# Patient Record
Sex: FEMALE | URBAN - METROPOLITAN AREA
[De-identification: names, ages, dates, MRNs, and addresses within clinical notes are randomized per-mention and may not be internally consistent; named-entity substitution may affect disease eponyms.]

---

## 2022-07-25 ENCOUNTER — APPOINTMENT (RX ONLY)
Dept: URBAN - METROPOLITAN AREA CLINIC 292 | Facility: CLINIC | Age: 42
Setting detail: DERMATOLOGY
End: 2022-07-25

## 2022-07-25 DIAGNOSIS — L21.8 OTHER SEBORRHEIC DERMATITIS: ICD-10-CM

## 2022-07-25 DIAGNOSIS — B07.8 OTHER VIRAL WARTS: ICD-10-CM

## 2022-07-25 DIAGNOSIS — L57.8 OTHER SKIN CHANGES DUE TO CHRONIC EXPOSURE TO NONIONIZING RADIATION: ICD-10-CM

## 2022-07-25 DIAGNOSIS — D22 MELANOCYTIC NEVI: ICD-10-CM

## 2022-07-25 DIAGNOSIS — H01.13 ECZEMATOUS DERMATITIS OF EYELID: ICD-10-CM | Status: INADEQUATELY CONTROLLED

## 2022-07-25 DIAGNOSIS — D485 NEOPLASM OF UNCERTAIN BEHAVIOR OF SKIN: ICD-10-CM

## 2022-07-25 PROBLEM — H01.131 ECZEMATOUS DERMATITIS OF RIGHT UPPER EYELID: Status: ACTIVE | Noted: 2022-07-25

## 2022-07-25 PROBLEM — H01.134 ECZEMATOUS DERMATITIS OF LEFT UPPER EYELID: Status: ACTIVE | Noted: 2022-07-25

## 2022-07-25 PROBLEM — D48.5 NEOPLASM OF UNCERTAIN BEHAVIOR OF SKIN: Status: ACTIVE | Noted: 2022-07-25

## 2022-07-25 PROBLEM — D22.9 MELANOCYTIC NEVI, UNSPECIFIED: Status: ACTIVE | Noted: 2022-07-25

## 2022-07-25 PROCEDURE — ? BIOPSY BY SHAVE METHOD

## 2022-07-25 PROCEDURE — ? FULL BODY SKIN EXAM - DECLINED

## 2022-07-25 PROCEDURE — 17110 DESTRUCTION B9 LES UP TO 14: CPT

## 2022-07-25 PROCEDURE — ? LIQUID NITROGEN

## 2022-07-25 PROCEDURE — ? COUNSELING

## 2022-07-25 PROCEDURE — 11102 TANGNTL BX SKIN SINGLE LES: CPT | Mod: 59

## 2022-07-25 PROCEDURE — 99203 OFFICE O/P NEW LOW 30 MIN: CPT | Mod: 25

## 2022-07-25 PROCEDURE — ? TREATMENT REGIMEN

## 2022-07-25 ASSESSMENT — LOCATION ZONE DERM
LOCATION ZONE: NOSE
LOCATION ZONE: TRUNK
LOCATION ZONE: FACE
LOCATION ZONE: EYELID
LOCATION ZONE: ARM
LOCATION ZONE: FINGER

## 2022-07-25 ASSESSMENT — LOCATION DETAILED DESCRIPTION DERM
LOCATION DETAILED: RIGHT ELBOW
LOCATION DETAILED: LEFT DISTAL PALMAR INDEX FINGER
LOCATION DETAILED: LEFT INFERIOR UPPER BACK
LOCATION DETAILED: RIGHT MEDIAL SUPERIOR EYELID
LOCATION DETAILED: LEFT NASAL ALA
LOCATION DETAILED: RIGHT MEDIAL MALAR CHEEK
LOCATION DETAILED: LEFT MEDIAL SUPERIOR EYELID

## 2022-07-25 ASSESSMENT — LOCATION SIMPLE DESCRIPTION DERM
LOCATION SIMPLE: RIGHT ELBOW
LOCATION SIMPLE: LEFT NOSE
LOCATION SIMPLE: LEFT INDEX FINGER
LOCATION SIMPLE: LEFT SUPERIOR EYELID
LOCATION SIMPLE: LEFT UPPER BACK
LOCATION SIMPLE: RIGHT CHEEK
LOCATION SIMPLE: RIGHT SUPERIOR EYELID

## 2022-07-25 NOTE — PROCEDURE: COUNSELING
Detail Level: Detailed
Patient Specific Counseling (Will Not Stick From Patient To Patient): Rec otc hydrocortisone for flares.
Patient Specific Counseling (Will Not Stick From Patient To Patient): \\n\\n  this started after a laser treatment for hair on eyebrows, she was given an RX for tacrolimus 6 weeks ago, and used it for about a week before stopping. This is still itchy and irritated, but has improved but not resolved. Discussed with patient to restart the tacrolimus BID.

## 2022-07-25 NOTE — PROCEDURE: LIQUID NITROGEN
Consent: The patient's consent was obtained including but not limited to risks of crusting, scabbing, blistering, scarring, darker or lighter pigmentary change, recurrence, incomplete removal and infection.
Detail Level: Detailed
Render Note In Bullet Format When Appropriate: No
Pared With?: 15 blade
Show Applicator Variable?: Yes
Spray Paint Text: The liquid nitrogen was applied to the skin utilizing a spray paint frosting technique.
Medical Necessity Clause: This procedure was medically necessary because the lesions that were treated were:
Post-Care Instructions: I reviewed with the patient in detail post-care instructions. Patient is to wear sunprotection, and avoid picking at any of the treated lesions. Pt may apply Vaseline to crusted or scabbing areas.
Number Of Freeze-Thaw Cycles: 3 freeze-thaw cycles
Duration Of Freeze Thaw-Cycle (Seconds): 15-20
Medical Necessity Information: It is in your best interest to select a reason for this procedure from the list below. All of these items fulfill various CMS LCD requirements except the new and changing color options.

## 2022-07-25 NOTE — PROCEDURE: TREATMENT REGIMEN
Continue Regimen: Tacrolimus
Detail Level: Zone
Plan: Thin layer bid to eyelids. Continue 1 week after rash resolves. Apply lotion or Vaseline after.

## 2022-07-25 NOTE — PROCEDURE: BIOPSY BY SHAVE METHOD
Detail Level: Detailed
Depth Of Biopsy: dermis
Was A Bandage Applied: Yes
Size Of Lesion In Cm: 0
Biopsy Type: H and E
Biopsy Method: curette
Anesthesia Volume In Cc (Will Not Render If 0): 0.5
Hemostasis: Brandie's
Wound Care: Petrolatum
Dressing: no dressing applied
Destruction After The Procedure: No
Type Of Destruction Used: Curettage
Curettage Text: The wound bed was treated with curettage after the biopsy was performed.
Cryotherapy Text: The wound bed was treated with cryotherapy after the biopsy was performed.
Electrodesiccation Text: The wound bed was treated with electrodesiccation after the biopsy was performed.
Electrodesiccation And Curettage Text: The wound bed was treated with electrodesiccation and curettage after the biopsy was performed.
Silver Nitrate Text: The wound bed was treated with silver nitrate after the biopsy was performed.
Lab: [id number]
Lab Facility: [id number]
Consent: Written consent was obtained and risks were reviewed including but not limited to scarring, infection, bleeding, scabbing, incomplete removal, nerve damage and allergy to anesthesia.
Post-Care Instructions: I reviewed with the patient in detail post-care instructions. Patient is to keep the biopsy site dry overnight, and then apply bacitracin twice daily until healed. Patient may apply hydrogen peroxide soaks to remove any crusting.
Notification Instructions: Patient will be notified of biopsy results. However, patient instructed to call the office if not contacted within 2 weeks.
Billing Type: Third-Party Bill
Information: Selecting Yes will display possible errors in your note based on the variables you have selected. This validation is only offered as a suggestion for you. PLEASE NOTE THAT THE VALIDATION TEXT WILL BE REMOVED WHEN YOU FINALIZE YOUR NOTE. IF YOU WANT TO FAX A PRELIMINARY NOTE YOU WILL NEED TO TOGGLE THIS TO 'NO' IF YOU DO NOT WANT IT IN YOUR FAXED NOTE.

## 2023-07-28 ENCOUNTER — APPOINTMENT (RX ONLY)
Dept: URBAN - METROPOLITAN AREA CLINIC 134 | Facility: CLINIC | Age: 43
Setting detail: DERMATOLOGY
End: 2023-07-28

## 2023-07-28 DIAGNOSIS — L91.8 OTHER HYPERTROPHIC DISORDERS OF THE SKIN: ICD-10-CM

## 2023-07-28 DIAGNOSIS — B07.8 OTHER VIRAL WARTS: ICD-10-CM

## 2023-07-28 PROCEDURE — ? COUNSELING

## 2023-07-28 PROCEDURE — 99202 OFFICE O/P NEW SF 15 MIN: CPT | Mod: 25

## 2023-07-28 PROCEDURE — ? BENIGN DESTRUCTION

## 2023-07-28 PROCEDURE — 17110 DESTRUCTION B9 LES UP TO 14: CPT

## 2023-07-28 PROCEDURE — ? PRESCRIPTION

## 2023-07-28 ASSESSMENT — LOCATION DETAILED DESCRIPTION DERM
LOCATION DETAILED: LEFT AXILLARY VAULT
LOCATION DETAILED: LEFT RIB CAGE
LOCATION DETAILED: LEFT DISTAL PALMAR INDEX FINGER
LOCATION DETAILED: RIGHT AXILLARY VAULT

## 2023-07-28 ASSESSMENT — LOCATION SIMPLE DESCRIPTION DERM
LOCATION SIMPLE: LEFT AXILLARY VAULT
LOCATION SIMPLE: RIGHT AXILLARY VAULT
LOCATION SIMPLE: LEFT INDEX FINGER
LOCATION SIMPLE: ABDOMEN

## 2023-07-28 ASSESSMENT — LOCATION ZONE DERM
LOCATION ZONE: TRUNK
LOCATION ZONE: FINGER
LOCATION ZONE: AXILLAE

## 2023-07-28 NOTE — PROCEDURE: BENIGN DESTRUCTION
Add 52 Modifier (Optional): no
Medical Necessity Information: It is in your best interest to select a reason for this procedure from the list below. All of these items fulfill various CMS LCD requirements except the new and changing color options.
Consent: The patient's consent was obtained including but not limited to risks of crusting, scabbing, blistering, scarring, darker or lighter pigmentary change, recurrence, incomplete removal and infection.
Treatment Number (Will Not Render If 0): 0
Medical Necessity Clause: This procedure was medically necessary because the lesions that were treated were:
Number Of Freeze-Thaw Cycles: 3 freeze-thaw cycles
Anesthesia Volume In Cc: 0.5
Post-Care Instructions: I reviewed with the patient in detail post-care instructions. Patient is to wear sunprotection, and avoid picking at any of the treated lesions. Pt may apply Vaseline to crusted or scabbing areas.
Detail Level: Detailed
Duration Of Freeze Thaw-Cycle (Seconds): 5

## 2024-01-10 ENCOUNTER — RX ONLY (OUTPATIENT)
Age: 44
Setting detail: RX ONLY
End: 2024-01-10

## 2024-06-03 ENCOUNTER — APPOINTMENT (RX ONLY)
Dept: URBAN - METROPOLITAN AREA CLINIC 292 | Facility: CLINIC | Age: 44
Setting detail: DERMATOLOGY
End: 2024-06-03

## 2024-06-03 DIAGNOSIS — L08.9 LOCAL INFECTION OF THE SKIN AND SUBCUTANEOUS TISSUE, UNSPECIFIED: ICD-10-CM

## 2024-06-03 DIAGNOSIS — D22 MELANOCYTIC NEVI: ICD-10-CM

## 2024-06-03 PROBLEM — D22.5 MELANOCYTIC NEVI OF TRUNK: Status: ACTIVE | Noted: 2024-06-03

## 2024-06-03 PROCEDURE — ? COUNSELING

## 2024-06-03 PROCEDURE — ? PRESCRIPTION

## 2024-06-03 PROCEDURE — 99213 OFFICE O/P EST LOW 20 MIN: CPT

## 2024-06-03 RX ORDER — BACITRACIN 500 U/G
OINTMENT TOPICAL
Qty: 30 | Refills: 6 | Status: ERX | COMMUNITY
Start: 2024-06-03

## 2024-06-03 RX ADMIN — BACITRACIN: 500 OINTMENT TOPICAL at 00:00

## 2024-06-03 ASSESSMENT — LOCATION DETAILED DESCRIPTION DERM
LOCATION DETAILED: LEFT NARIS
LOCATION DETAILED: MIDDLE STERNUM

## 2024-06-03 ASSESSMENT — LOCATION ZONE DERM
LOCATION ZONE: NOSE
LOCATION ZONE: TRUNK

## 2024-06-03 ASSESSMENT — LOCATION SIMPLE DESCRIPTION DERM
LOCATION SIMPLE: CHEST
LOCATION SIMPLE: LEFT NOSE

## 2024-06-03 NOTE — HPI: SKIN LESION
What Type Of Note Output Would You Prefer (Optional)?: Standard Output
Is This A New Presentation, Or A Follow-Up?: Skin Lesions
Additional History: Pt reports concerning nevus on back that has been present for months as well as lesions distributed throughout body.

## 2024-07-31 RX ORDER — BACITRACIN 500 U/G
OINTMENT TOPICAL
Qty: 30 | Refills: 6 | Status: ERX